# Patient Record
Sex: MALE | Race: WHITE | NOT HISPANIC OR LATINO
[De-identification: names, ages, dates, MRNs, and addresses within clinical notes are randomized per-mention and may not be internally consistent; named-entity substitution may affect disease eponyms.]

---

## 2017-05-12 ENCOUNTER — APPOINTMENT (OUTPATIENT)
Dept: NEPHROLOGY | Facility: CLINIC | Age: 62
End: 2017-05-12

## 2017-05-12 VITALS — HEART RATE: 62 BPM | DIASTOLIC BLOOD PRESSURE: 76 MMHG | SYSTOLIC BLOOD PRESSURE: 160 MMHG

## 2017-05-12 VITALS — WEIGHT: 224 LBS

## 2017-05-15 ENCOUNTER — RX RENEWAL (OUTPATIENT)
Age: 62
End: 2017-05-15

## 2017-10-06 ENCOUNTER — RX RENEWAL (OUTPATIENT)
Age: 62
End: 2017-10-06

## 2017-10-10 ENCOUNTER — RX RENEWAL (OUTPATIENT)
Age: 62
End: 2017-10-10

## 2017-11-09 ENCOUNTER — APPOINTMENT (OUTPATIENT)
Dept: NEPHROLOGY | Facility: CLINIC | Age: 62
End: 2017-11-09
Payer: COMMERCIAL

## 2017-11-09 VITALS — WEIGHT: 233 LBS | HEART RATE: 60 BPM | DIASTOLIC BLOOD PRESSURE: 80 MMHG | SYSTOLIC BLOOD PRESSURE: 140 MMHG

## 2017-11-09 PROCEDURE — 99214 OFFICE O/P EST MOD 30 MIN: CPT

## 2017-11-09 RX ORDER — FINASTERIDE 5 MG/1
5 TABLET, FILM COATED ORAL
Refills: 0 | Status: ACTIVE | COMMUNITY

## 2017-11-14 ENCOUNTER — RX RENEWAL (OUTPATIENT)
Age: 62
End: 2017-11-14

## 2018-02-02 NOTE — H&P ADULT - NSHPPHYSICALEXAM_GEN_ALL_CORE
Normal head, atraumatic. Normal skin, no rashes/lesions/erythema.    MSK: L-Spine: +LROM due to pain.      Rest of PE per medical clearance.

## 2018-02-02 NOTE — ASU PATIENT PROFILE, ADULT - MEDICATION HERBAL REMEDIES, PROFILE
Subjective     History provided by:  Patient and spouse   used: No        Review of Systems    No past medical history on file.    No Known Allergies    No past surgical history on file.    No family history on file.    Social History     Social History   • Marital status: N/A     Spouse name: N/A   • Number of children: N/A   • Years of education: N/A     Social History Main Topics   • Smoking status: Not on file   • Smokeless tobacco: Not on file   • Alcohol use Not on file   • Drug use: Not on file   • Sexual activity: Not on file     Other Topics Concern   • Not on file     Social History Narrative           Objective   Physical Exam    Procedures         ED Course  ED Course                  MDM  Number of Diagnoses or Management Options      Final diagnoses:   None          no

## 2018-02-02 NOTE — H&P ADULT - PROBLEM SELECTOR PLAN 1
Admit to Dr. Sparks's service.  Plan for elective right lumbar laminectomy L5-S1.  Medically stable and cleared for surgery by Dr. Pacheco.

## 2018-02-02 NOTE — H&P ADULT - NSHPLABSRESULTS_GEN_ALL_CORE
Preop CBC, BMP, PT/PTT/INR, UA within normal limits- reviewed by medical clearance.   Preop EKG: Sinus bradycardia, reviewed by medical clearance.   CXR: Within normal limits, per medical clearance.

## 2018-02-05 ENCOUNTER — OUTPATIENT (OUTPATIENT)
Dept: OUTPATIENT SERVICES | Facility: HOSPITAL | Age: 63
LOS: 1 days | Discharge: HOME | End: 2018-02-05
Payer: COMMERCIAL

## 2018-02-05 ENCOUNTER — RESULT REVIEW (OUTPATIENT)
Age: 63
End: 2018-02-05

## 2018-02-05 VITALS
WEIGHT: 225.09 LBS | TEMPERATURE: 99 F | HEART RATE: 58 BPM | OXYGEN SATURATION: 98 % | HEIGHT: 72 IN | SYSTOLIC BLOOD PRESSURE: 170 MMHG | DIASTOLIC BLOOD PRESSURE: 87 MMHG

## 2018-02-05 VITALS
RESPIRATION RATE: 21 BRPM | HEART RATE: 66 BPM | SYSTOLIC BLOOD PRESSURE: 140 MMHG | OXYGEN SATURATION: 95 % | DIASTOLIC BLOOD PRESSURE: 78 MMHG

## 2018-02-05 DIAGNOSIS — M51.9 UNSPECIFIED THORACIC, THORACOLUMBAR AND LUMBOSACRAL INTERVERTEBRAL DISC DISORDER: ICD-10-CM

## 2018-02-05 DIAGNOSIS — Z98.890 OTHER SPECIFIED POSTPROCEDURAL STATES: Chronic | ICD-10-CM

## 2018-02-05 PROCEDURE — 86850 RBC ANTIBODY SCREEN: CPT

## 2018-02-05 PROCEDURE — 86900 BLOOD TYPING SEROLOGIC ABO: CPT

## 2018-02-05 PROCEDURE — 95940 IONM IN OPERATNG ROOM 15 MIN: CPT

## 2018-02-05 PROCEDURE — 97161 PT EVAL LOW COMPLEX 20 MIN: CPT

## 2018-02-05 PROCEDURE — 86901 BLOOD TYPING SEROLOGIC RH(D): CPT

## 2018-02-05 PROCEDURE — C1889: CPT

## 2018-02-05 PROCEDURE — 63030 LAMOT DCMPRN NRV RT 1 LMBR: CPT | Mod: RT

## 2018-02-05 PROCEDURE — 76000 FLUOROSCOPY <1 HR PHYS/QHP: CPT

## 2018-02-05 PROCEDURE — 88304 TISSUE EXAM BY PATHOLOGIST: CPT

## 2018-02-05 RX ORDER — SODIUM CHLORIDE 9 MG/ML
1000 INJECTION, SOLUTION INTRAVENOUS
Qty: 0 | Refills: 0 | Status: DISCONTINUED | OUTPATIENT
Start: 2018-02-05 | End: 2018-02-05

## 2018-02-05 RX ORDER — TRAMADOL HYDROCHLORIDE 50 MG/1
1 TABLET ORAL
Qty: 15 | Refills: 0 | OUTPATIENT
Start: 2018-02-05

## 2018-02-05 RX ORDER — OXYCODONE AND ACETAMINOPHEN 5; 325 MG/1; MG/1
1 TABLET ORAL
Qty: 0 | Refills: 0 | Status: DISCONTINUED | OUTPATIENT
Start: 2018-02-05 | End: 2018-02-05

## 2018-02-05 RX ORDER — OXYCODONE AND ACETAMINOPHEN 5; 325 MG/1; MG/1
2 TABLET ORAL EVERY 4 HOURS
Qty: 0 | Refills: 0 | Status: DISCONTINUED | OUTPATIENT
Start: 2018-02-05 | End: 2018-02-05

## 2018-02-05 NOTE — PROGRESS NOTE ADULT - SUBJECTIVE AND OBJECTIVE BOX
Patient seen and examined in preoperative area with wife at chairside.  Exam notable for persistent and unchanged right       lower extremity weakness with trace EHL, 2-3 Dorsiflexion, knee flexion 4/5.  The condition and treatment options were discussed with the patient.  The risks, benefits and alternatives to surgery were discussed.   The primary goal of surgery is to relieve the residual  radicular pain but also maximize recovery of motor function for the drop foot.  No guarantee can be made for any, let alone, full motor recovery. The complications of surgery were discussed and include, but are not limited to, wound problems, infection, bleeding, vascular injury, dural defect/cerebrospinal fluid leak, instability, need for further surgery including fusion/instrumention, recurrent disc herniation/radicular symptoms, persistent symptoms, deep vein thrombosis, pulmonary embolus, myocardial infarction, stroke and death.  All questions were answered and informed consent was obtained.  Patient was also advised about slightly elevated liver enzymes and he reported knowing that he had (and was treated for) hepatitis C in the 1980's with  what he recalls was interferon. He thought he was "cured." Based on the preliminary lab results as reported by Dr. Pacheco, he has hepatitis C at this time.  Patient was informed of this new finding and the need further workup and evaluation following his surgical recovery.  Notes and/or documentation on this chart is/are signed but not reviewed for accuracy.

## 2018-02-05 NOTE — PROGRESS NOTE ADULT - ASSESSMENT
Spoke with patient and family (wife) preoperatively and they aware I will be assisting during today's surgery and disclosure performed.

## 2018-02-05 NOTE — PACU DISCHARGE NOTE - COMMENTS
pt met pacu criteria to be d/c'd home. Vitals stable. pain controlled. No bleeding noted. OOB ambulating w/walker w/PT. Copy of provider post op instructions/medication reconcilliation instructions given. Pt verbalized understanding. IV d/c'd. Pt home w/walker. Pt escorted to lobby w/wife and nursing attendant via wheelchair.

## 2018-02-14 LAB — SURGICAL PATHOLOGY STUDY: SIGNIFICANT CHANGE UP

## 2018-04-09 ENCOUNTER — APPOINTMENT (OUTPATIENT)
Dept: NEPHROLOGY | Facility: CLINIC | Age: 63
End: 2018-04-09
Payer: COMMERCIAL

## 2018-04-09 VITALS — HEART RATE: 60 BPM | DIASTOLIC BLOOD PRESSURE: 88 MMHG | SYSTOLIC BLOOD PRESSURE: 158 MMHG

## 2018-04-09 VITALS — DIASTOLIC BLOOD PRESSURE: 84 MMHG | SYSTOLIC BLOOD PRESSURE: 144 MMHG

## 2018-04-09 PROBLEM — M10.9 GOUT, UNSPECIFIED: Chronic | Status: ACTIVE | Noted: 2018-02-02

## 2018-04-09 PROBLEM — I10 ESSENTIAL (PRIMARY) HYPERTENSION: Chronic | Status: ACTIVE | Noted: 2018-02-02

## 2018-04-09 PROBLEM — N40.0 BENIGN PROSTATIC HYPERPLASIA WITHOUT LOWER URINARY TRACT SYMPTOMS: Chronic | Status: ACTIVE | Noted: 2018-02-02

## 2018-04-09 PROCEDURE — 99214 OFFICE O/P EST MOD 30 MIN: CPT

## 2018-05-23 ENCOUNTER — RX RENEWAL (OUTPATIENT)
Age: 63
End: 2018-05-23

## 2018-06-25 ENCOUNTER — APPOINTMENT (OUTPATIENT)
Dept: NEPHROLOGY | Facility: CLINIC | Age: 63
End: 2018-06-25
Payer: COMMERCIAL

## 2018-06-25 VITALS — HEART RATE: 62 BPM | DIASTOLIC BLOOD PRESSURE: 88 MMHG | SYSTOLIC BLOOD PRESSURE: 144 MMHG

## 2018-06-25 PROCEDURE — 99213 OFFICE O/P EST LOW 20 MIN: CPT

## 2018-10-08 ENCOUNTER — APPOINTMENT (OUTPATIENT)
Dept: NEPHROLOGY | Facility: CLINIC | Age: 63
End: 2018-10-08

## 2018-10-26 ENCOUNTER — RX RENEWAL (OUTPATIENT)
Age: 63
End: 2018-10-26

## 2018-11-15 ENCOUNTER — APPOINTMENT (OUTPATIENT)
Dept: NEPHROLOGY | Facility: CLINIC | Age: 63
End: 2018-11-15
Payer: COMMERCIAL

## 2018-11-15 VITALS — HEART RATE: 66 BPM | SYSTOLIC BLOOD PRESSURE: 136 MMHG | WEIGHT: 233 LBS | DIASTOLIC BLOOD PRESSURE: 80 MMHG

## 2018-11-15 PROCEDURE — 99214 OFFICE O/P EST MOD 30 MIN: CPT

## 2018-12-26 ENCOUNTER — RX RENEWAL (OUTPATIENT)
Age: 63
End: 2018-12-26

## 2019-02-01 ENCOUNTER — RX RENEWAL (OUTPATIENT)
Age: 64
End: 2019-02-01

## 2019-05-16 ENCOUNTER — APPOINTMENT (OUTPATIENT)
Dept: NEPHROLOGY | Facility: CLINIC | Age: 64
End: 2019-05-16

## 2019-05-29 ENCOUNTER — RX RENEWAL (OUTPATIENT)
Age: 64
End: 2019-05-29

## 2019-09-01 NOTE — PATIENT PROFILE ADULT. - VISION (WITH CORRECTIVE LENSES IF THE PATIENT USUALLY WEARS THEM):
Partially impaired: cannot see medication labels or newsprint, but can see obstacles in path, and the surrounding layout; can count fingers at arm's length/reading glasses Offered and patient declined

## 2019-10-21 ENCOUNTER — RX RENEWAL (OUTPATIENT)
Age: 64
End: 2019-10-21

## 2019-10-30 ENCOUNTER — RX RENEWAL (OUTPATIENT)
Age: 64
End: 2019-10-30

## 2019-12-12 ENCOUNTER — RX RENEWAL (OUTPATIENT)
Age: 64
End: 2019-12-12

## 2020-01-29 ENCOUNTER — APPOINTMENT (OUTPATIENT)
Dept: NEPHROLOGY | Facility: CLINIC | Age: 65
End: 2020-01-29
Payer: COMMERCIAL

## 2020-01-29 VITALS
WEIGHT: 236 LBS | SYSTOLIC BLOOD PRESSURE: 132 MMHG | HEART RATE: 52 BPM | BODY MASS INDEX: 33.04 KG/M2 | HEIGHT: 71 IN | DIASTOLIC BLOOD PRESSURE: 70 MMHG

## 2020-01-29 PROCEDURE — 99215 OFFICE O/P EST HI 40 MIN: CPT

## 2020-01-29 NOTE — CONSULT LETTER
[Dear  ___] : Dear  [unfilled], [Courtesy Letter:] : I had the pleasure of seeing your patient, [unfilled], in my office today. [Please see my note below.] : Please see my note below. [Consult Closing:] : Thank you very much for allowing me to participate in the care of this patient.  If you have any questions, please do not hesitate to contact me. [Sincerely,] : Sincerely, [FreeTextEntry3] : Sincerely, \par \par Luis Uribe MD, FACP  [DrLuis  ___] : Dr. FRENCH [DrLuis ___] : Dr. FRENCH

## 2020-01-29 NOTE — REVIEW OF SYSTEMS
[Recent Weight Loss (___ Lbs)] : recent [unfilled] ~Ulb weight loss [Nocturia] : nocturia [Hesitancy] : urinary hesitancy [Negative] : Heme/Lymph [FreeTextEntry2] : 10 lbs last 3 wks [FreeTextEntry8] : hx BPH

## 2020-01-29 NOTE — ASSESSMENT
[FreeTextEntry1] : 64 yo man with hx HBP, gout,MANJIT, obesiy, hyperlipidemia/metab synd?, hx adrenal "cyst" - no hypokalemia, no sx's to suggest pheo. Doing remarkably well 9 days post TKR. He will see Dr Mendoza at [facility] today, and Dr Robert Mondragon there on Feb 6 re: adrenal. I have not seen him before, and welcome both their thoughts. My concerns expressed to [place] today: 1.consider tapering/stopping b-blocker (bradycardia, wt gain, fatigue, lack of efficacy at age 63 or low PRA)  ,   2.NSAID exposure - may raise BP and explain high home readings. Would like to reduce dose and stop as soon as possible.   3. If TG still 280 & HDL 30, would consider adding Vascepa both to improve those, and provide CV protection based on recent large outcomes trial.  4. Need to continue wt loss, which will benefit OA, MANJIT,lipids, CV risk etc.

## 2020-01-29 NOTE — PHYSICAL EXAM
[General Appearance - Alert] : alert [General Appearance - In No Acute Distress] : in no acute distress [Sclera] : the sclera and conjunctiva were normal [PERRL With Normal Accommodation] : pupils were equal in size, round, and reactive to light [Extraocular Movements] : extraocular movements were intact [Outer Ear] : the ears and nose were normal in appearance [Oropharynx] : the oropharynx was normal [Neck Appearance] : the appearance of the neck was normal [Neck Cervical Mass (___cm)] : no neck mass was observed [Jugular Venous Distention Increased] : there was no jugular-venous distention [Thyroid Diffuse Enlargement] : the thyroid was not enlarged [Thyroid Nodule] : there were no palpable thyroid nodules [Auscultation Breath Sounds / Voice Sounds] : lungs were clear to auscultation bilaterally [Heart Rate And Rhythm] : heart rate was normal and rhythm regular [Heart Sounds] : normal S1 and S2 [Heart Sounds Gallop] : no gallops [Murmurs] : no murmurs [Heart Sounds Pericardial Friction Rub] : no pericardial rub [Full Pulse] : the pedal pulses are present [Edema] : there was no peripheral edema [Bowel Sounds] : normal bowel sounds [Abdomen Soft] : soft [Abdomen Tenderness] : non-tender [Abdomen Mass (___ Cm)] : no abdominal mass palpated [Cervical Lymph Nodes Enlarged Posterior Bilaterally] : posterior cervical [Cervical Lymph Nodes Enlarged Anterior Bilaterally] : anterior cervical [Supraclavicular Lymph Nodes Enlarged Bilaterally] : supraclavicular [Axillary Lymph Nodes Enlarged Bilaterally] : axillary [Femoral Lymph Nodes Enlarged Bilaterally] : femoral [Inguinal Lymph Nodes Enlarged Bilaterally] : inguinal [No CVA Tenderness] : no ~M costovertebral angle tenderness [No Spinal Tenderness] : no spinal tenderness [Nail Clubbing] : no clubbing  or cyanosis of the fingernails [Musculoskeletal - Swelling] : no joint swelling seen [Motor Tone] : muscle strength and tone were normal [FreeTextEntry1] : ambulating remarkably postop, but limping [Skin Color & Pigmentation] : normal skin color and pigmentation [Skin Turgor] : normal skin turgor [] : no rash [Deep Tendon Reflexes (DTR)] : deep tendon reflexes were 2+ and symmetric [Sensation] : the sensory exam was normal to light touch and pinprick [No Focal Deficits] : no focal deficits [Oriented To Time, Place, And Person] : oriented to person, place, and time [Impaired Insight] : insight and judgment were intact [Affect] : the affect was normal

## 2020-01-29 NOTE — HISTORY OF PRESENT ILLNESS
[FreeTextEntry1] : 64 yo man with hx of HBP, gout, hyperlipidemia (probable metabolic synd -high TG, low HDL), hep C MANJIT- on CPAP, BPH, OA knees. Now 9 day post-op rt knee replacement at [facility] by Dr Young, and doing great!   Was given meloxicam 15 mg qd.  BP at home running 140's-150's, with HR in low 50's (was 40's during sleep postop). Gout quiescent x 3+ yrs. BP was 133/66 at d/c. BP meds have been : hctz 25 qd, metoprolol 100 qd, ramipril 10 bid.

## 2020-02-03 ENCOUNTER — RX RENEWAL (OUTPATIENT)
Age: 65
End: 2020-02-03

## 2020-05-11 ENCOUNTER — RX RENEWAL (OUTPATIENT)
Age: 65
End: 2020-05-11

## 2020-06-08 ENCOUNTER — RX RENEWAL (OUTPATIENT)
Age: 65
End: 2020-06-08

## 2020-07-06 ENCOUNTER — RX RENEWAL (OUTPATIENT)
Age: 65
End: 2020-07-06

## 2020-07-07 ENCOUNTER — RX RENEWAL (OUTPATIENT)
Age: 65
End: 2020-07-07

## 2020-08-18 ENCOUNTER — APPOINTMENT (OUTPATIENT)
Dept: NEPHROLOGY | Facility: CLINIC | Age: 65
End: 2020-08-18
Payer: COMMERCIAL

## 2020-08-18 VITALS
HEART RATE: 64 BPM | BODY MASS INDEX: 32.9 KG/M2 | WEIGHT: 235 LBS | HEIGHT: 71 IN | SYSTOLIC BLOOD PRESSURE: 146 MMHG | DIASTOLIC BLOOD PRESSURE: 76 MMHG

## 2020-08-18 PROCEDURE — 99215 OFFICE O/P EST HI 40 MIN: CPT

## 2020-08-18 NOTE — REVIEW OF SYSTEMS
[Hesitancy] : urinary hesitancy [Nocturia] : nocturia [Joint Pain] : joint pain [Arthralgias] : arthralgias [Negative] : Endocrine

## 2020-08-18 NOTE — ASSESSMENT
[FreeTextEntry1] : 65-year-old man with resistant hypertension despite a good 4 drug regimen -his systolic BP is averaging 145 today, which is certainly suboptimal.  While he does not appear to have evidence of primary aldosteronism for many data I have, the fact that he has been worked up for primary aldosteronism and has resistant hypertension, suggest that Spironolactone 25 mg daily would be reasonable add-on choice  -with substantial evidence from a number of studies including [facility], 2015.  I have asked him to check his BP at home and return in 5 weeks to assess possible improvement in BP control.  I have also put in a requisition for labs to be drawn which will be available to Dr. Gale Muniz, who recently started him on a statin.  He would also be a good candidate probably for Vascepa, not only to control hypertriglyceridemia but also reduce cardiovascular risk.

## 2020-08-18 NOTE — HISTORY OF PRESENT ILLNESS
[FreeTextEntry1] : 65-year-old man with a history of resistant hypertension, hyperlipidemia, hyperuricemia/gout, metabolic syndrome, MANJIT/CPAP, hepatitis C, BPH -he underwent total knee replacement in January and took meloxicam 15 mg for probably 2 to 3 weeks.  His current antihypertensive regimen consists of ramipril 10 mg twice daily, metoprolol 100 mg daily, amlodipine 10 mg daily, and torsemide 7.5 mg daily.  However, his BP is typically 1 40-1 60 systolic -he has not checked it at home recently.  His potassium has typically been in the range of 3.6-4.1.  He is known to have an adrenal gland cyst which was thoroughly evaluated at [facility] by argenis Mondragon & Kelly.  He apparently had an adrenal vein catheterization/sampling, and presumably it did not lateralize to the side of the cyst.  His renal function has been normal with a creatinine typically in the 0.8 range.

## 2020-08-18 NOTE — CONSULT LETTER
[Dear  ___] : Dear  [unfilled], [Consult Closing:] : Thank you very much for allowing me to participate in the care of this patient.  If you have any questions, please do not hesitate to contact me. [Consult Letter:] : I had the pleasure of evaluating your patient, [unfilled]. [Please see my note below.] : Please see my note below. [Sincerely,] : Sincerely, [FreeTextEntry2] : Dr Lita Licona [DrLuis  ___] : Dr. FRENCH [DrLuis ___] : Dr. FRENCH [FreeTextEntry3] : Sincerely, \par \par Luis Uribe MD, FACP

## 2020-08-18 NOTE — PHYSICAL EXAM
[General Appearance - Alert] : alert [General Appearance - In No Acute Distress] : in no acute distress [Sclera] : the sclera and conjunctiva were normal [PERRL With Normal Accommodation] : pupils were equal in size, round, and reactive to light [Extraocular Movements] : extraocular movements were intact [Outer Ear] : the ears and nose were normal in appearance [Oropharynx] : the oropharynx was normal [Neck Appearance] : the appearance of the neck was normal [Jugular Venous Distention Increased] : there was no jugular-venous distention [Neck Cervical Mass (___cm)] : no neck mass was observed [Thyroid Nodule] : there were no palpable thyroid nodules [Thyroid Diffuse Enlargement] : the thyroid was not enlarged [Auscultation Breath Sounds / Voice Sounds] : lungs were clear to auscultation bilaterally [Heart Rate And Rhythm] : heart rate was normal and rhythm regular [Heart Sounds] : normal S1 and S2 [Heart Sounds Gallop] : no gallops [Murmurs] : no murmurs [Heart Sounds Pericardial Friction Rub] : no pericardial rub [Full Pulse] : the pedal pulses are present [Edema] : there was no peripheral edema [Bowel Sounds] : normal bowel sounds [Abdomen Soft] : soft [Abdomen Tenderness] : non-tender [Abdomen Mass (___ Cm)] : no abdominal mass palpated [Cervical Lymph Nodes Enlarged Anterior Bilaterally] : anterior cervical [Cervical Lymph Nodes Enlarged Posterior Bilaterally] : posterior cervical [Femoral Lymph Nodes Enlarged Bilaterally] : femoral [Axillary Lymph Nodes Enlarged Bilaterally] : axillary [Supraclavicular Lymph Nodes Enlarged Bilaterally] : supraclavicular [No CVA Tenderness] : no ~M costovertebral angle tenderness [Inguinal Lymph Nodes Enlarged Bilaterally] : inguinal [Nail Clubbing] : no clubbing  or cyanosis of the fingernails [No Spinal Tenderness] : no spinal tenderness [Musculoskeletal - Swelling] : no joint swelling seen [Motor Tone] : muscle strength and tone were normal [FreeTextEntry1] : ambulating remarkably postop, but limping [Skin Color & Pigmentation] : normal skin color and pigmentation [] : no rash [Skin Turgor] : normal skin turgor [Deep Tendon Reflexes (DTR)] : deep tendon reflexes were 2+ and symmetric [No Focal Deficits] : no focal deficits [Sensation] : the sensory exam was normal to light touch and pinprick [Impaired Insight] : insight and judgment were intact [Oriented To Time, Place, And Person] : oriented to person, place, and time [Affect] : the affect was normal

## 2020-09-21 ENCOUNTER — APPOINTMENT (OUTPATIENT)
Dept: NEPHROLOGY | Facility: CLINIC | Age: 65
End: 2020-09-21
Payer: COMMERCIAL

## 2020-09-21 VITALS
SYSTOLIC BLOOD PRESSURE: 152 MMHG | WEIGHT: 235 LBS | DIASTOLIC BLOOD PRESSURE: 78 MMHG | HEIGHT: 71 IN | HEART RATE: 68 BPM | BODY MASS INDEX: 32.9 KG/M2

## 2020-09-21 VITALS — DIASTOLIC BLOOD PRESSURE: 74 MMHG | SYSTOLIC BLOOD PRESSURE: 142 MMHG

## 2020-09-21 DIAGNOSIS — M19.90 UNSPECIFIED OSTEOARTHRITIS, UNSPECIFIED SITE: ICD-10-CM

## 2020-09-21 PROCEDURE — 99215 OFFICE O/P EST HI 40 MIN: CPT

## 2020-09-21 NOTE — ASSESSMENT
[FreeTextEntry1] : 65-year-old man with resistant/refractory hypertension, who is BP is not yet optimal despite 5 medications.  He never did the labs that I ordered, but has the requisition -he promises to go for that later today.  I have asked him to start Edarbi chlor 40/12.5 mg daily (free samples) in place of ramipril and torsemide.  If this works better, it will also reduce his pill burden substantially by replacing 3-1/2 pills/day with 1.  We will schedule a telehealth visit in 2 to 3 weeks to assess his response to this altered regimen.  It is not clear to me whether Spironolactone at 25 mg daily helped at all but I will not stop at this time, particularly since we do not know his current potassium yet.

## 2020-09-21 NOTE — PHYSICAL EXAM
[General Appearance - Alert] : alert [General Appearance - In No Acute Distress] : in no acute distress [Sclera] : the sclera and conjunctiva were normal [PERRL With Normal Accommodation] : pupils were equal in size, round, and reactive to light [Extraocular Movements] : extraocular movements were intact [Outer Ear] : the ears and nose were normal in appearance [Oropharynx] : the oropharynx was normal [Neck Appearance] : the appearance of the neck was normal [Neck Cervical Mass (___cm)] : no neck mass was observed [Jugular Venous Distention Increased] : there was no jugular-venous distention [Thyroid Diffuse Enlargement] : the thyroid was not enlarged [Thyroid Nodule] : there were no palpable thyroid nodules [Auscultation Breath Sounds / Voice Sounds] : lungs were clear to auscultation bilaterally [Heart Rate And Rhythm] : heart rate was normal and rhythm regular [Heart Sounds] : normal S1 and S2 [Heart Sounds Gallop] : no gallops [Murmurs] : no murmurs [Heart Sounds Pericardial Friction Rub] : no pericardial rub [Full Pulse] : the pedal pulses are present [Edema] : there was no peripheral edema [Bowel Sounds] : normal bowel sounds [Abdomen Soft] : soft [Abdomen Tenderness] : non-tender [Abdomen Mass (___ Cm)] : no abdominal mass palpated [Cervical Lymph Nodes Enlarged Posterior Bilaterally] : posterior cervical [Cervical Lymph Nodes Enlarged Anterior Bilaterally] : anterior cervical [Supraclavicular Lymph Nodes Enlarged Bilaterally] : supraclavicular [Axillary Lymph Nodes Enlarged Bilaterally] : axillary [Femoral Lymph Nodes Enlarged Bilaterally] : femoral [Inguinal Lymph Nodes Enlarged Bilaterally] : inguinal [No CVA Tenderness] : no ~M costovertebral angle tenderness [No Spinal Tenderness] : no spinal tenderness [Nail Clubbing] : no clubbing  or cyanosis of the fingernails [Musculoskeletal - Swelling] : no joint swelling seen [Motor Tone] : muscle strength and tone were normal [Skin Color & Pigmentation] : normal skin color and pigmentation [Skin Turgor] : normal skin turgor [] : no rash [Deep Tendon Reflexes (DTR)] : deep tendon reflexes were 2+ and symmetric [Sensation] : the sensory exam was normal to light touch and pinprick [No Focal Deficits] : no focal deficits [Oriented To Time, Place, And Person] : oriented to person, place, and time [Impaired Insight] : insight and judgment were intact [Affect] : the affect was normal [FreeTextEntry1] : ambulating remarkably postop, but limping

## 2020-09-21 NOTE — HISTORY OF PRESENT ILLNESS
[FreeTextEntry1] : 65-year-old man with a history of resistant hypertension, hyperlipidemia, hyper uricemia/gout, metabolic syndrome, MANJIT/CPAP, hepatitis C, BPH, and osteoarthritis status post right total knee replacement 8 months ago.  He did take meloxicam for several weeks after that but not since then.  His current antihypertensive regimen consists of ramipril 10 mg twice daily, metoprolol 100 mg daily, amlodipine 10 mg daily,  torsemide 7.5 mg daily.,  And Spironolactone 25 mg daily.  However his BP was 144/76 on his last visit, and is averaging about 145/75 at home.  He has not had any recent gout attacks.  He tolerates medications quite well.

## 2020-10-11 ENCOUNTER — RX RENEWAL (OUTPATIENT)
Age: 65
End: 2020-10-11

## 2020-10-12 ENCOUNTER — APPOINTMENT (OUTPATIENT)
Dept: NEPHROLOGY | Facility: CLINIC | Age: 65
End: 2020-10-12
Payer: COMMERCIAL

## 2020-10-12 VITALS
SYSTOLIC BLOOD PRESSURE: 130 MMHG | DIASTOLIC BLOOD PRESSURE: 74 MMHG | HEART RATE: 72 BPM | BODY MASS INDEX: 32.9 KG/M2 | WEIGHT: 235 LBS | HEIGHT: 71 IN

## 2020-10-12 DIAGNOSIS — G47.33 OBSTRUCTIVE SLEEP APNEA (ADULT) (PEDIATRIC): ICD-10-CM

## 2020-10-12 DIAGNOSIS — I10 ESSENTIAL (PRIMARY) HYPERTENSION: ICD-10-CM

## 2020-10-12 DIAGNOSIS — Z80.1 FAMILY HISTORY OF MALIGNANT NEOPLASM OF TRACHEA, BRONCHUS AND LUNG: ICD-10-CM

## 2020-10-12 DIAGNOSIS — Z82.49 FAMILY HISTORY OF ISCHEMIC HEART DISEASE AND OTHER DISEASES OF THE CIRCULATORY SYSTEM: ICD-10-CM

## 2020-10-12 DIAGNOSIS — E27.8 OTHER SPECIFIED DISORDERS OF ADRENAL GLAND: ICD-10-CM

## 2020-10-12 DIAGNOSIS — M54.5 LOW BACK PAIN: ICD-10-CM

## 2020-10-12 DIAGNOSIS — B19.20 UNSPECIFIED VIRAL HEPATITIS C W/OUT HEPATIC COMA: ICD-10-CM

## 2020-10-12 DIAGNOSIS — Z99.89 OBSTRUCTIVE SLEEP APNEA (ADULT) (PEDIATRIC): ICD-10-CM

## 2020-10-12 PROCEDURE — 99214 OFFICE O/P EST MOD 30 MIN: CPT | Mod: 95

## 2020-10-12 NOTE — ASSESSMENT
[FreeTextEntry1] : 65-year-old man with resistant hypertension, who is BP seems to have improved somewhat since starting Edarbi chlor 40/12.5 mg daily -we were able to reduce his pill burden from 3-1/2 pills to 1 by making that change.  He would like to continue to stay on it and we will have him  more samples, a co-pay benefit card -and if that does not work out price wise, we will send a prescription to [facility]

## 2020-10-12 NOTE — HISTORY OF PRESENT ILLNESS
[Home] : at home, [unfilled] , at the time of the visit. [Medical Office: ([facility])___] : at the medical office located in  [Verbal consent obtained from patient] : the patient, [unfilled] [FreeTextEntry1] : Discussed with patient : You have chosen to receive care through the use of tele-media.  It enables healthcare providers at different locations to provide safe, effective, and convenient care through the use of technology.  Please note this is a billable encounter.  As with any healthcare service, there are risks associated with the use of tele-media, including  issues.  You understand that I cannot physically examine you and that you may need to come to the office to complete the assessment.   Patient agreed verbally and understands the risks and benefits of tele-media as explained.  All questions regarding tele-media encounters were answered.\par         65-year-old man with a history of resistant hypertension, hyperlipidemia, hyperuricemia/gout, MANJIT/CPAP, hep C, BPH, osteoarthritis status post right total knee replacement 9 months ago.  He has been on ramipril 10 mg twice daily, metoprolol 100 mg daily amlodipine 10 mg daily, Spironolactone 25 mg daily, and I added Edarbi chlor 40/12.5 mg 3 weeks ago (replacing ramipril 10 twice daily and torsemide 7.5 mg daily).  His BP has improved somewhat with readings like 126/70, 120/64, 130/74, 136/69, but still interspersed occasionally with readings in the mid 140s over 80s.  It appears to both of us that his BP has improved if not yet optimal.  He has no side effects.  He runs out of samples today.

## 2020-10-12 NOTE — PHYSICAL EXAM
[General Appearance - Alert] : alert [General Appearance - In No Acute Distress] : in no acute distress [Sclera] : the sclera and conjunctiva were normal [PERRL With Normal Accommodation] : pupils were equal in size, round, and reactive to light [Outer Ear] : the ears and nose were normal in appearance [Neck Appearance] : the appearance of the neck was normal [Neck Cervical Mass (___cm)] : no neck mass was observed [Jugular Venous Distention Increased] : there was no jugular-venous distention [Abnormal Walk] : normal gait [Nail Clubbing] : no clubbing  or cyanosis of the fingernails [Musculoskeletal - Swelling] : no joint swelling seen [Skin Color & Pigmentation] : normal skin color and pigmentation [Skin Turgor] : normal skin turgor [] : no rash [Deep Tendon Reflexes (DTR)] : deep tendon reflexes were 2+ and symmetric [No Focal Deficits] : no focal deficits [Oriented To Time, Place, And Person] : oriented to person, place, and time [Impaired Insight] : insight and judgment were intact [Affect] : the affect was normal

## 2020-10-20 ENCOUNTER — RX RENEWAL (OUTPATIENT)
Age: 65
End: 2020-10-20

## 2021-02-08 ENCOUNTER — EMERGENCY (EMERGENCY)
Facility: HOSPITAL | Age: 66
LOS: 1 days | Discharge: HOME | End: 2021-02-08
Admitting: EMERGENCY MEDICINE
Payer: COMMERCIAL

## 2021-02-08 VITALS
RESPIRATION RATE: 18 BRPM | TEMPERATURE: 98 F | SYSTOLIC BLOOD PRESSURE: 147 MMHG | HEIGHT: 72 IN | HEART RATE: 62 BPM | DIASTOLIC BLOOD PRESSURE: 74 MMHG | OXYGEN SATURATION: 100 %

## 2021-02-08 DIAGNOSIS — Z98.890 OTHER SPECIFIED POSTPROCEDURAL STATES: Chronic | ICD-10-CM

## 2021-02-08 DIAGNOSIS — Z20.822 CONTACT WITH AND (SUSPECTED) EXPOSURE TO COVID-19: ICD-10-CM

## 2021-02-08 DIAGNOSIS — Z79.1 LONG TERM (CURRENT) USE OF NON-STEROIDAL ANTI-INFLAMMATORIES (NSAID): ICD-10-CM

## 2021-02-08 LAB — SARS-COV-2 RNA SPEC QL NAA+PROBE: SIGNIFICANT CHANGE UP

## 2021-02-08 PROCEDURE — 99283 EMERGENCY DEPT VISIT LOW MDM: CPT

## 2021-02-08 PROCEDURE — 99282 EMERGENCY DEPT VISIT SF MDM: CPT

## 2021-02-08 PROCEDURE — U0005: CPT

## 2021-02-08 PROCEDURE — U0003: CPT

## 2021-02-08 NOTE — ED PROVIDER NOTE - PATIENT PORTAL LINK FT
You can access the FollowMyHealth Patient Portal offered by [facility] by registering at the following website: [email or web address]. By joining [facility]’s FollowMyHealth portal, you will also be able to view your health information using other applications (apps) compatible with our system.

## 2021-02-08 NOTE — ED PROVIDER NOTE - HIV OFFER
"Pharmacy Chemotherapy Verification    Treatment deferred due to illness    Gaye Barbazachary Antoine  Dx: RA        Protocol: Rituximab     Rituximab 1000 mg IV Days 1 and 15  [id number]  Jag HITCHCOCK, et al. N Engl J Med 2004; [id number]. Efficacy of B-Cell-Targeted Therapy with Rituximab in Patients with Rheumatoid Arthritis       Allergies: Sulfa drugs      /91   Pulse 87   Temp 36.4 °C (97.6 °F)   Resp 18   Ht 1.651 m (5' 5\")   Wt 80.6 kg (177 lb 11.1 oz)   LMP 03/01/2005   SpO2 90%   BMI 29.57 kg/m²     Body surface area is 1.92 meters squared.    LABS 11/28/2017  ANC~ 1240 Plt = 261k   Hgb/Hct = 16.3/50.0   SCr = 0.72 mg/dL CrCl ~ 83 mL/min (min Scr 0.7 used) LFT's = 26/37/92 TBili = 0.5       9/28/16 Quantiferon Gold: negative  12/2/16 Hep B: negative     Drug Order   (Drug name, dose, route, IV Fluid & volume, frequency, number of doses)                  Fixed dose. No calulation required.  OK to treat with final dose.       By my signature below, I confirm this process was performed independently with the BSA and all final chemotherapy dosing calculations congruent. I have reviewed the above chemotherapy order and that my calculation of the final dose and BSA (when applicable) corroborate those calculations of the  pharmacist. Discrepancies of 5% or greater in the written dose have been addressed and documented within the [facility] Progress notes.    BROOKS Fernandez, PharmD            " Opt out

## 2021-02-08 NOTE — ED PROVIDER NOTE - CONDITION AT DISCHARGE:
I have personally seen and examined this patient.  I have fully participated in the care of this patient. I have reviewed all pertinent clinical information, including history, physical exam, plan and the Resident’s note and agree except as noted.
Satisfactory

## 2021-02-17 ENCOUNTER — RX RENEWAL (OUTPATIENT)
Age: 66
End: 2021-02-17

## 2021-03-12 ENCOUNTER — RX RENEWAL (OUTPATIENT)
Age: 66
End: 2021-03-12

## 2021-03-14 ENCOUNTER — RX RENEWAL (OUTPATIENT)
Age: 66
End: 2021-03-14

## 2021-04-05 ENCOUNTER — RX RENEWAL (OUTPATIENT)
Age: 66
End: 2021-04-05

## 2021-06-01 ENCOUNTER — RX RENEWAL (OUTPATIENT)
Age: 66
End: 2021-06-01

## 2021-06-07 ENCOUNTER — RX RENEWAL (OUTPATIENT)
Age: 66
End: 2021-06-07

## 2021-06-16 ENCOUNTER — RX RENEWAL (OUTPATIENT)
Age: 66
End: 2021-06-16

## 2021-07-07 ENCOUNTER — RX RENEWAL (OUTPATIENT)
Age: 66
End: 2021-07-07

## 2021-08-09 ENCOUNTER — RX RENEWAL (OUTPATIENT)
Age: 66
End: 2021-08-09

## 2021-09-13 ENCOUNTER — RX RENEWAL (OUTPATIENT)
Age: 66
End: 2021-09-13

## 2021-09-22 ENCOUNTER — APPOINTMENT (OUTPATIENT)
Dept: NEPHROLOGY | Facility: CLINIC | Age: 66
End: 2021-09-22
Payer: COMMERCIAL

## 2021-09-22 ENCOUNTER — RX RENEWAL (OUTPATIENT)
Age: 66
End: 2021-09-22

## 2021-09-22 VITALS — HEART RATE: 56 BPM | OXYGEN SATURATION: 98 % | DIASTOLIC BLOOD PRESSURE: 65 MMHG | SYSTOLIC BLOOD PRESSURE: 107 MMHG

## 2021-09-22 PROCEDURE — 99214 OFFICE O/P EST MOD 30 MIN: CPT

## 2021-09-22 RX ORDER — RAMIPRIL 10 MG/1
10 CAPSULE ORAL
Qty: 180 | Refills: 3 | Status: DISCONTINUED | COMMUNITY
Start: 2017-11-14 | End: 2021-09-22

## 2021-09-26 NOTE — ASSESSMENT
[FreeTextEntry1] : all lab data was reviewed with patient in detail from 9/14/2021\par 65 yo man with HTN, CKD 3, hyperkalemia, hyperlipidemia, hyperuricemia/gout, MANJIT/CPAP, hep C, BPH, osteoarthritis\par -HTN- BP on low side today.\par with increases in creat and K- \par instructed to dc spironolactone\par reviewed home BP monitoring technique: seated position, arm supported on table- 3 measurements 2-5 minutes apart- record lowest BP \par reminded to forward monthly BP readings to office.\par will need repeat chem 1-2 weeks\par \par f/u OV 2-3 months

## 2021-09-26 NOTE — PHYSICAL EXAM
[General Appearance - Alert] : alert [General Appearance - In No Acute Distress] : in no acute distress [] : no respiratory distress [Auscultation Breath Sounds / Voice Sounds] : lungs were clear to auscultation bilaterally [Heart Rate And Rhythm] : heart rate was normal and rhythm regular [Heart Sounds] : normal S1 and S2 [Heart Sounds Gallop] : no gallops [Murmurs] : no murmurs [Heart Sounds Pericardial Friction Rub] : no pericardial rub [Edema] : there was no peripheral edema [No CVA Tenderness] : no ~M costovertebral angle tenderness [Oriented To Time, Place, And Person] : oriented to person, place, and time [Impaired Insight] : insight and judgment were intact [Affect] : the affect was normal

## 2021-09-26 NOTE — HISTORY OF PRESENT ILLNESS
[FreeTextEntry1] : 67 yo man here for f/u evaluation of HTN, hyperlipidemia, hyperuricemia/gout, MANJIT/CPAP, hep C, BPH, osteoarthritis; Edarbi chlor 40/12.5 mg was added 10/2020 in place of ramipril and torsemide\par s/p right TKR 2020. \par Feels that his home BP readings are good.\par Denies flank pain, dysuria, hematuria or frothy urine \par No SOB, CP.\par Not into regular exercise regimen as yet.

## 2021-10-06 ENCOUNTER — TRANSCRIPTION ENCOUNTER (OUTPATIENT)
Age: 66
End: 2021-10-06

## 2021-10-19 ENCOUNTER — RX RENEWAL (OUTPATIENT)
Age: 66
End: 2021-10-19

## 2021-11-15 ENCOUNTER — RX RENEWAL (OUTPATIENT)
Age: 66
End: 2021-11-15

## 2021-11-21 ENCOUNTER — RX RENEWAL (OUTPATIENT)
Age: 66
End: 2021-11-21

## 2021-12-02 ENCOUNTER — APPOINTMENT (OUTPATIENT)
Dept: NEPHROLOGY | Facility: CLINIC | Age: 66
End: 2021-12-02
Payer: COMMERCIAL

## 2021-12-02 VITALS — OXYGEN SATURATION: 98 % | SYSTOLIC BLOOD PRESSURE: 124 MMHG | DIASTOLIC BLOOD PRESSURE: 72 MMHG | HEART RATE: 78 BPM

## 2021-12-02 PROCEDURE — 99214 OFFICE O/P EST MOD 30 MIN: CPT

## 2021-12-02 RX ORDER — ATORVASTATIN CALCIUM 10 MG/1
10 TABLET, FILM COATED ORAL
Refills: 0 | Status: ACTIVE | COMMUNITY

## 2021-12-02 NOTE — HISTORY OF PRESENT ILLNESS
[FreeTextEntry1] : 65 yo man with HTN, hyperlipidemia, hyperuricemia/gout, MANJIT/CPAP, hep C, BPH, osteoarthritis, here for follow up evaluation.\par reports no change to meds- but is on atorvastatin which is not on list- added now.\par continues on  Edarbi chlor 40/12.5 mg was added 10/2020 in place of ramipril and torsemide\par s/p right TKR 2020- fully recovered- and will have left knee evaluated after new year\par  home BP readings are good.\par Denies flank pain, dysuria, hematuria or frothy urine \par No SOB, CP.\par

## 2021-12-02 NOTE — ASSESSMENT
[FreeTextEntry1] : all lab data was reviewed with patient in detail from 11/10/2021\par 67 yo man with HTN, CKD 3, hyperkalemia, hyperlipidemia, hyperuricemia/gout, MANJIT/CPAP, hep C, BPH, osteoarthritis\par creat 1.24- good egfr> 60\par -HTN- BP controlled- but does remember if he's taking spironolactone or not- last OV instructed to dc- \par he will check when he gets home and notify office; c/w his present regimen\par reminded to forward monthly BP readings to office.\par -hyperkalemia- K improved to 5.0- will notify us in regard to use of spironolactone (or not) c/w K limited diet\par -hyperlipidemia- c/w atorvastatin- LP next OV\par -elevated glucoses advised to reduce Etoh intake, exercise more, lose weight- will measure A1c with next OV- few years agon was 5.1%\par -overweight- encouraged weight loss and exercise\par \par f/u OV 4-6 months\par \par ADDENDUM 4:30p 12/2- pt says that he HAS been TAKING spironolactone- instructed to continue taking it since BP and K are at goal\par

## 2022-01-18 ENCOUNTER — RX RENEWAL (OUTPATIENT)
Age: 67
End: 2022-01-18

## 2022-03-22 ENCOUNTER — RX RENEWAL (OUTPATIENT)
Age: 67
End: 2022-03-22

## 2022-04-04 ENCOUNTER — APPOINTMENT (OUTPATIENT)
Dept: NEPHROLOGY | Facility: CLINIC | Age: 67
End: 2022-04-04
Payer: COMMERCIAL

## 2022-04-04 VITALS — HEIGHT: 72 IN | WEIGHT: 233 LBS | BODY MASS INDEX: 31.56 KG/M2

## 2022-04-04 VITALS — SYSTOLIC BLOOD PRESSURE: 118 MMHG | DIASTOLIC BLOOD PRESSURE: 63 MMHG

## 2022-04-04 DIAGNOSIS — N17.9 ACUTE KIDNEY FAILURE, UNSPECIFIED: ICD-10-CM

## 2022-04-04 PROCEDURE — 99214 OFFICE O/P EST MOD 30 MIN: CPT

## 2022-04-04 NOTE — HISTORY OF PRESENT ILLNESS
[FreeTextEntry1] : 65 yo man here for a f/u evaluation with CKD 3, hyperkalemia, HTN HLD MANJIT/CPAP\par patient had L knee replacement surgery around a week and a half ago has been doing in home PT; \par pre op chems revealed k 5/9- repeated was 5.1ish, per patient \par is still avoiding K rich foods\par has been having trouble sleeping at night thinks its positional\par not monitoring home BP, but was running in 110-125 range perioperatively\par Denies flank pain, dysuria, hematuria or frothy urine \par No SOB, CP.\par

## 2022-04-04 NOTE — ASSESSMENT
[FreeTextEntry1] : all lab data was reviewed with patient in detail from 3/22/2022 (from patients my portal)\par 65 yo man with HTN, CKD 3, hyperkalemia, hyperlipidemia, hyperuricemia/gout, MANJIT/CPAP, hep C, BPH, osteoarthritis\par -CKD 3- creat 1.3- stable egfr <56\par -HTN- BP a bit below goal; instructed patient to restart Home BP measurements.\par If running < 125 consider reduction of amlodipine (or hold amlodipine for a few days)- he will keep me apprised\par -hyperkalemia- K stable at  5.0-  c/w K limited diet- if feels that he wants to enjoy more K rich foods, offered that we can add potassium lowering agent\par -hyperlipidemia- c/w atorvastatin- \par -overweight- encouraged weight loss and exercise\par \par to f/u in 6 months\par \par \par

## 2022-04-12 ENCOUNTER — NON-APPOINTMENT (OUTPATIENT)
Age: 67
End: 2022-04-12

## 2022-06-16 ENCOUNTER — NON-APPOINTMENT (OUTPATIENT)
Age: 67
End: 2022-06-16

## 2022-07-19 ENCOUNTER — NON-APPOINTMENT (OUTPATIENT)
Age: 67
End: 2022-07-19

## 2022-07-20 ENCOUNTER — APPOINTMENT (OUTPATIENT)
Dept: HEMATOLOGY ONCOLOGY | Facility: CLINIC | Age: 67
End: 2022-07-20

## 2022-07-20 ENCOUNTER — LABORATORY RESULT (OUTPATIENT)
Age: 67
End: 2022-07-20

## 2022-07-20 VITALS
WEIGHT: 228 LBS | SYSTOLIC BLOOD PRESSURE: 142 MMHG | HEART RATE: 87 BPM | OXYGEN SATURATION: 96 % | TEMPERATURE: 97.8 F | BODY MASS INDEX: 30.88 KG/M2 | DIASTOLIC BLOOD PRESSURE: 82 MMHG | HEIGHT: 72 IN

## 2022-07-20 DIAGNOSIS — R79.0 ABNORMAL LVL OF BLOOD MINERAL: ICD-10-CM

## 2022-07-20 DIAGNOSIS — Z98.890 PRECIPITOUS DROP IN HEMATOCRIT: ICD-10-CM

## 2022-07-20 DIAGNOSIS — D64.9 ANEMIA, UNSPECIFIED: ICD-10-CM

## 2022-07-20 DIAGNOSIS — E53.8 DEFICIENCY OF OTHER SPECIFIED B GROUP VITAMINS: ICD-10-CM

## 2022-07-20 DIAGNOSIS — R71.0 PRECIPITOUS DROP IN HEMATOCRIT: ICD-10-CM

## 2022-07-20 LAB
RBC # BLD: 4.71 M/UL
RETICS # AUTO: 2 %
RETICS AGGREG/RBC NFR: 94.2 K/UL

## 2022-07-20 PROCEDURE — 36415 COLL VENOUS BLD VENIPUNCTURE: CPT

## 2022-07-20 PROCEDURE — 96372 THER/PROPH/DIAG INJ SC/IM: CPT

## 2022-07-20 PROCEDURE — 99205 OFFICE O/P NEW HI 60 MIN: CPT | Mod: 25

## 2022-07-20 RX ORDER — CYANOCOBALAMIN 1000 UG/ML
1000 INJECTION INTRAMUSCULAR; SUBCUTANEOUS
Qty: 0 | Refills: 0 | Status: COMPLETED | OUTPATIENT
Start: 2022-07-20

## 2022-07-20 RX ORDER — AZILSARTAN KAMEDOXOMIL AND CHLORTHALIDONE 40; 12.5 MG/1; MG/1
40-12.5 TABLET ORAL DAILY
Qty: 30 | Refills: 0 | Status: DISCONTINUED | COMMUNITY
Start: 2020-10-12 | End: 2022-07-20

## 2022-07-20 RX ORDER — RIVAROXABAN 10 MG/1
10 TABLET, FILM COATED ORAL
Qty: 28 | Refills: 0 | Status: DISCONTINUED | COMMUNITY
Start: 2022-03-24 | End: 2022-07-20

## 2022-07-20 RX ORDER — SPIRONOLACTONE 25 MG/1
25 TABLET ORAL
Qty: 30 | Refills: 0 | Status: DISCONTINUED | COMMUNITY
Start: 2020-08-20 | End: 2022-07-20

## 2022-07-20 RX ADMIN — CYANOCOBALAMIN 1 MCG/ML: 1000 INJECTION INTRAMUSCULAR; SUBCUTANEOUS at 00:00

## 2022-07-20 NOTE — ASSESSMENT
[FreeTextEntry1] : The patient is a 67 year old male with a history of hypertension, hyperlipemia, hepatitis C, osteoarthritis status post bilateral knee replacements, COVID-19 infection in July of 2022,  who is referred for evaluation of anemia.  It appears that anemia was due to perioperative blood loss and blood loss due to epistaxis while on Xarelto for DVT prophylaxis, but will evaluate for other causes.  Low-level B12 will be further evaluated in this patient of [place]  descent.  B12 deficiency may also be due to the fact that patient rarely eats red meat and restricts his diet.  Have ordered CBC, reticulocyte count, manual differential, comprehensive metabolic panel, B12, folate, iron panel, ferritin, intrinsic factor antibody, parietal cell antibody, ARACELI, rheumatoid factor, protein electrophoresis, LDH and haptoglobin.  B12 1000 mcg was administered intramuscularly to the right deltoid without incident.  The patient was advised to call the office to discuss results and further management.

## 2022-07-20 NOTE — HISTORY OF PRESENT ILLNESS
[de-identified] :  Patient is a 67 year old male with a history of hypertension, hyperlipemia, hepatitis C, osteoarthritis status post bilateral knee replacements, COVID-19 infection in July of 2022,  who is referred for evaluation of anemia. Patient had left knee replacement in March of 2022 after which in April the hemoglobin was 9.7, hematocrit was 28.9.  Postoperatively patient was placed on Xarelto for DVT prophylaxis and admits to having an episode of severe epistaxis. Xarelto was discontinued. The patient saw his PCP in June, hemoglobin improved to 13.0, with a hematocrit of 39.6 normal WBC at 5.9 and platelet count of 214,000. Also at that time iron saturation was low at 19% but with a ferritin on 133. B12 was on the low end of normal at 329 and folate was normal at greater than 24.0. Patient recently had COVID-19 infection (early July) at which time he had sore throat and runny nose for 3 days.  He has since tested negative by PCR.  He feels generally well at this time with no acute complaints. Denies fever, fatigue, dizziness, chills, shortness of breath, chest pain, abdominal pain, blood in the urine of stool, or other infections.

## 2022-07-20 NOTE — PHYSICAL EXAM
[Obese] : obese [Normal] : affect appropriate [de-identified] : obese [de-identified] : s/p bilateral knee replacements [de-identified] : Tanned, multiple tattoos

## 2022-07-20 NOTE — CONSULT LETTER
[Dear  ___] : Dear  [unfilled], [Consult Letter:] : I had the pleasure of evaluating your patient, [unfilled]. [( Thank you for referring [unfilled] for consultation for _____ )] : Thank you for referring [unfilled] for consultation for [unfilled] [Please see my note below.] : Please see my note below. [Consult Closing:] : Thank you very much for allowing me to participate in the care of this patient.  If you have any questions, please do not hesitate to contact me. [Sincerely,] : Sincerely, [FreeTextEntry3] : Sharlene Ramires

## 2022-07-21 LAB
ALBUMIN SERPL ELPH-MCNC: 4.9 G/DL
ALP BLD-CCNC: 89 U/L
ALT SERPL-CCNC: 34 U/L
ANION GAP SERPL CALC-SCNC: 13 MMOL/L
AST SERPL-CCNC: 58 U/L
BASOPHILS # BLD AUTO: 0.05 K/UL
BASOPHILS # BLD AUTO: 0.05 K/UL
BASOPHILS NFR BLD AUTO: 0.9 %
BASOPHILS NFR BLD AUTO: 0.9 %
BILIRUB SERPL-MCNC: 0.7 MG/DL
BUN SERPL-MCNC: 20 MG/DL
CALCIUM SERPL-MCNC: 10.1 MG/DL
CHLORIDE SERPL-SCNC: 102 MMOL/L
CO2 SERPL-SCNC: 24 MMOL/L
CREAT SERPL-MCNC: 0.92 MG/DL
EGFR: 92 ML/MIN/1.73M2
EOSINOPHIL # BLD AUTO: 0.05 K/UL
EOSINOPHIL # BLD AUTO: 0.05 K/UL
EOSINOPHIL NFR BLD AUTO: 0.9 %
EOSINOPHIL NFR BLD AUTO: 0.9 %
FERRITIN SERPL-MCNC: 200 NG/ML
FOLATE SERPL-MCNC: >20 NG/ML
GIANT PLATELETS BLD QL SMEAR: PRESENT
GLUCOSE SERPL-MCNC: 97 MG/DL
HAPTOGLOB SERPL-MCNC: 113 MG/DL
HCT VFR BLD CALC: 41.4 %
HGB BLD-MCNC: 13.8 G/DL
IRON SATN MFR SERPL: 23 %
IRON SERPL-MCNC: 73 UG/DL
LDH SERPL-CCNC: 191 U/L
LYMPHOCYTES # BLD AUTO: 1.69 K/UL
LYMPHOCYTES # BLD AUTO: 1.69 K/UL
LYMPHOCYTES NFR BLD AUTO: 30.7 %
LYMPHOCYTES NFR BLD AUTO: 30.7 %
MAN DIFF?: NORMAL
MCHC RBC-ENTMCNC: 29.3 PG
MCHC RBC-ENTMCNC: 33.3 GM/DL
MCV RBC AUTO: 87.9 FL
MONOCYTES # BLD AUTO: 0.29 K/UL
MONOCYTES # BLD AUTO: 0.29 K/UL
MONOCYTES NFR BLD AUTO: 5.2 %
MONOCYTES NFR BLD AUTO: 5.2 %
MSMEAR: NORMAL
NEUTROPHILS # BLD AUTO: 3.43 K/UL
NEUTROPHILS # BLD AUTO: 3.43 K/UL
NEUTROPHILS NFR BLD AUTO: 62.3 %
NEUTROPHILS NFR BLD AUTO: 62.3 %
PLAT MORPH BLD: NORMAL
PLATELET # BLD AUTO: 250 K/UL
POTASSIUM SERPL-SCNC: 5 MMOL/L
PROT SERPL-MCNC: 7.6 G/DL
RBC # BLD: 4.71 M/UL
RBC # FLD: 13.6 %
RBC BLD AUTO: NORMAL
RHEUMATOID FACT SER QL: <10 IU/ML
SODIUM SERPL-SCNC: 140 MMOL/L
TIBC SERPL-MCNC: 320 UG/DL
UIBC SERPL-MCNC: 247 UG/DL
VIT B12 SERPL-MCNC: 360 PG/ML
WBC # FLD AUTO: 5.51 K/UL

## 2022-07-24 LAB — PCA AB SER QL IF: NORMAL

## 2022-07-25 LAB — IF BLOCK AB SER QL: 1 AU/ML

## 2022-07-26 LAB
ALBUMIN MFR SERPL ELPH: 58.2 %
ALBUMIN SERPL-MCNC: 4.4 G/DL
ALBUMIN/GLOB SERPL: 1.4 RATIO
ALPHA1 GLOB MFR SERPL ELPH: 3.5 %
ALPHA1 GLOB SERPL ELPH-MCNC: 0.3 G/DL
ALPHA2 GLOB MFR SERPL ELPH: 10 %
ALPHA2 GLOB SERPL ELPH-MCNC: 0.8 G/DL
B-GLOBULIN MFR SERPL ELPH: 10.6 %
B-GLOBULIN SERPL ELPH-MCNC: 0.8 G/DL
GAMMA GLOB FLD ELPH-MCNC: 1.3 G/DL
GAMMA GLOB MFR SERPL ELPH: 17.7 %
INTERPRETATION SERPL IEP-IMP: NORMAL
PROT SERPL-MCNC: 7.6 G/DL
PROT SERPL-MCNC: 7.6 G/DL

## 2022-07-28 LAB — ANA SER IF-ACNC: NEGATIVE

## 2022-08-05 ENCOUNTER — RX RENEWAL (OUTPATIENT)
Age: 67
End: 2022-08-05

## 2022-10-19 ENCOUNTER — APPOINTMENT (OUTPATIENT)
Dept: NEPHROLOGY | Facility: CLINIC | Age: 67
End: 2022-10-19

## 2022-10-19 VITALS
HEART RATE: 82 BPM | DIASTOLIC BLOOD PRESSURE: 86 MMHG | BODY MASS INDEX: 31.87 KG/M2 | WEIGHT: 235 LBS | SYSTOLIC BLOOD PRESSURE: 152 MMHG

## 2022-10-19 PROCEDURE — 99214 OFFICE O/P EST MOD 30 MIN: CPT

## 2022-10-19 NOTE — ASSESSMENT
[FreeTextEntry1] : all lab data was reviewed with patient in detail from  10/17/2022\par 68 yo man with HTN, CKD 3, hyperkalemia, hyperlipidemia, hyperuricemia/gout, MANJIT/CPAP, hep C, BPH, osteoarthritis\par -CKD 3- creat  .92- good within known range .9-1.35; off RASi for now may be reflected in this lower creat\par -HTN- BP uncontrolled- he prefers trial of lifestyle modification- he likes being on less medication-\par okay to try- f/u OV Jan-Feb- for BP check if no better increase BP meds.\par Also instructed him to measure his BP 2 consecutive days each month- Days 1,2 of each month\par -hyperkalemia- K 4.4- improved off RASi for now- c/w low K diet for now\par -proteinuria- low grade off RASi-  as above, working on lifestyle modification and weight loss- restart RASi next OV if off goal\par -hyperlipidemia- c/w atorvastatin- \par -overweight- emphasized need to reduce his weight to optimize BP and reduce proteinuria \par -transaminitis- mild- following with PCP/GI\par \par to f/u 3-4 months\par \par \par

## 2022-10-19 NOTE — HISTORY OF PRESENT ILLNESS
[FreeTextEntry1] : 66 yo man with CKD 3, hyperkalemia, HTN HLD MANJIT/CPAP, here for follow up evaluation\par s/p 3/2022 L TKR\par now fully recovered from his knee surgery- much more active\par BPs were low post op and into April/May - held edarbyclor, spirolactone and metoprolol- \par on amlodipine 10 mg as his only BP med at this time\par now reports that his BPs trending up a bit at other MD visits- has not measured at home\par avoiding K rich foods\par weight up 8-10 pounds\par Denies flank pain, dysuria, hematuria or frothy urine \par No SOB, CP.\par

## 2023-01-07 NOTE — PHYSICAL THERAPY INITIAL EVALUATION ADULT - AMBULATION SKILLS, REHAB EVAL
independent - diagnosed about 8 yrs ago  - follows with Dr. 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

## 2023-02-22 ENCOUNTER — APPOINTMENT (OUTPATIENT)
Dept: NEPHROLOGY | Facility: CLINIC | Age: 68
End: 2023-02-22
Payer: COMMERCIAL

## 2023-02-22 VITALS
HEART RATE: 76 BPM | DIASTOLIC BLOOD PRESSURE: 86 MMHG | BODY MASS INDEX: 32.41 KG/M2 | SYSTOLIC BLOOD PRESSURE: 154 MMHG | WEIGHT: 239 LBS

## 2023-02-22 PROCEDURE — 99214 OFFICE O/P EST MOD 30 MIN: CPT

## 2023-02-22 RX ORDER — GINKGO BILOBA LEAF EXTRACT 120 MG
250 CAPSULE ORAL
Refills: 0 | Status: DISCONTINUED | COMMUNITY
End: 2023-02-22

## 2023-02-22 NOTE — HISTORY OF PRESENT ILLNESS
[FreeTextEntry1] : 66 yo man here for f/u evaluation of CKD 3, hyperkalemia, HTN HLD MANJIT/CPAP.\par weight up a bit\par BPs were low post op and into April/May - held edarbyclor, spirolactone and metoprolol- \par on amlodipine 10 mg as his only BP med at this time\par BPs trending up a bit \par Denies flank pain, dysuria, hematuria or frothy urine \par No SOB, CP.\par \par s/p 3/2022 L TKR\par

## 2023-02-22 NOTE — ASSESSMENT
[FreeTextEntry1] : all lab data was reviewed with patient in detail from 2/17/2023\par 66 yo man with HTN, CKD 3, hyperkalemia, hyperlipidemia, hyperuricemia/gout, MANJIT/CPAP, hep C, BPH, \par -HTN- BP above goal- reviewed home BP monitoring technique: seated position, arm supported on table- 3 measurements 2-5 minutes apart- record lowest BP -forward reading to office each month\par reviewed target is 1330/80\par if no improvement with lifestyle modification restart prior BP meds\par -CKD 3- creat  .91-  very good.  range .9-1.35; off RASi for now may be reflected in this lower creat\par -hyperkalemia- K 4.6- good- c/w low K diet for now\par -proteinuria- Ualb/creat 282- if no better once BP better, consider addition of sglt2i; anticipate that he will need to restart RASi soon\par await BP trends\par -hyperlipidemia- LP at goal; c/w atorvastatin- \par -overweight- rediscussed need to reduce weight \par -transaminitis- normalized except mild increase AST to 65 - he is following with PCP/GI\par \par f/u 6 months\par but expect monthly BP readings \par \par \par \par \par \par \par \par \par to f/u 3-4 months\par \par \par

## 2023-03-02 ENCOUNTER — NON-APPOINTMENT (OUTPATIENT)
Age: 68
End: 2023-03-02

## 2023-05-04 ENCOUNTER — RX RENEWAL (OUTPATIENT)
Age: 68
End: 2023-05-04

## 2023-08-30 ENCOUNTER — APPOINTMENT (OUTPATIENT)
Dept: NEPHROLOGY | Facility: CLINIC | Age: 68
End: 2023-08-30
Payer: COMMERCIAL

## 2023-08-30 VITALS
DIASTOLIC BLOOD PRESSURE: 82 MMHG | HEART RATE: 78 BPM | WEIGHT: 237 LBS | BODY MASS INDEX: 32.14 KG/M2 | SYSTOLIC BLOOD PRESSURE: 156 MMHG

## 2023-08-30 PROCEDURE — 99214 OFFICE O/P EST MOD 30 MIN: CPT

## 2023-09-01 NOTE — ASSESSMENT
[FreeTextEntry1] : all lab data was reviewed with patient in detail from 8/24/2023 67 yo man with HTN, CKD 3, hyperkalemia, hyperlipidemia, hyperuricemia/gout, MANJIT/CPAP, hep C, BPH,  -HTN- BP above goal- reviewed home BP monitoring technique: seated position, arm supported on table- 3 measurements 2-5 minutes apart- record lowest BP and bring reading to next visit.  reviewed target is 130/80 Fow now to continue with Amlodipine and will restart Ramapril 5mg, will follow up labs in 6 weeks, sCr stable and K+ with in limits will increase to 10mg for better BP control  -CKD 3- creat  .91-  very good.  range .9- -proteinuria- Ualb/creat  331, elevated from last visit, restart on Ramapril, will follow up labs and adjust dose  consider sglt2i -hyperlipidemia- LP at goal; c/w atorvastatin-  -overweight- rediscussed need to reduce weight, diet exercise    f/u 3 months Leonela Morales PGY 5 Nephrology Fellow

## 2023-09-01 NOTE — PHYSICAL EXAM
[General Appearance - Alert] : alert [General Appearance - In No Acute Distress] : in no acute distress [] : no respiratory distress [Auscultation Breath Sounds / Voice Sounds] : lungs were clear to auscultation bilaterally [Heart Rate And Rhythm] : heart rate was normal and rhythm regular [Heart Sounds] : normal S1 and S2 [Heart Sounds Gallop] : no gallops [Murmurs] : no murmurs [Heart Sounds Pericardial Friction Rub] : no pericardial rub [Edema] : there was no peripheral edema [No CVA Tenderness] : no ~M costovertebral angle tenderness [Oriented To Time, Place, And Person] : oriented to person, place, and time [Impaired Insight] : insight and judgment were intact [Affect] : the affect was normal [Sclera] : the sclera and conjunctiva were normal [PERRL With Normal Accommodation] : pupils were equal in size, round, and reactive to light [Neck Appearance] : the appearance of the neck was normal [FreeTextEntry1] : tip of nose with cream

## 2023-09-01 NOTE — HISTORY OF PRESENT ILLNESS
[FreeTextEntry1] : 69 yo man here for f/u evaluation of CKD 3, hyperkalemia, HTN HLD MANJIT/CPAP. Feels well overall, staying active, playing golf and bike riding weight down 2 pounds  Melanoma removed last week  benign  BPs trending up a bit, only on Amlodipine, weight stable, states drinking a bit more  Denies flank pain, dysuria, hematuria or frothy urine  No SOB, CP.

## 2023-09-01 NOTE — END OF VISIT
[] : Fellow [FreeTextEntry3] : seen and examined with Dr. Morales BP above target- have restarted ramipril- trend creat and K may benefit from sglt2i emphasized need to lose weight

## 2023-11-29 ENCOUNTER — APPOINTMENT (OUTPATIENT)
Dept: NEPHROLOGY | Facility: CLINIC | Age: 68
End: 2023-11-29
Payer: COMMERCIAL

## 2023-11-29 VITALS
SYSTOLIC BLOOD PRESSURE: 142 MMHG | HEART RATE: 80 BPM | WEIGHT: 239 LBS | BODY MASS INDEX: 32.41 KG/M2 | DIASTOLIC BLOOD PRESSURE: 82 MMHG

## 2023-11-29 DIAGNOSIS — M10.9 GOUT, UNSPECIFIED: ICD-10-CM

## 2023-11-29 DIAGNOSIS — E78.5 HYPERLIPIDEMIA, UNSPECIFIED: ICD-10-CM

## 2023-11-29 PROCEDURE — 99214 OFFICE O/P EST MOD 30 MIN: CPT

## 2024-02-06 ENCOUNTER — RX RENEWAL (OUTPATIENT)
Age: 69
End: 2024-02-06

## 2024-02-06 RX ORDER — RAMIPRIL 10 MG/1
10 CAPSULE ORAL DAILY
Qty: 90 | Refills: 3 | Status: ACTIVE | COMMUNITY
Start: 2023-11-29 | End: 1900-01-01

## 2024-02-15 ENCOUNTER — APPOINTMENT (OUTPATIENT)
Dept: NEPHROLOGY | Facility: CLINIC | Age: 69
End: 2024-02-15
Payer: COMMERCIAL

## 2024-02-15 VITALS
HEART RATE: 78 BPM | SYSTOLIC BLOOD PRESSURE: 146 MMHG | DIASTOLIC BLOOD PRESSURE: 80 MMHG | WEIGHT: 240 LBS | BODY MASS INDEX: 32.55 KG/M2

## 2024-02-15 PROCEDURE — G2211 COMPLEX E/M VISIT ADD ON: CPT

## 2024-02-15 PROCEDURE — 99214 OFFICE O/P EST MOD 30 MIN: CPT

## 2024-02-15 RX ORDER — RAMIPRIL 5 MG/1
5 CAPSULE ORAL DAILY
Qty: 90 | Refills: 2 | Status: DISCONTINUED | COMMUNITY
Start: 2023-08-30 | End: 2024-02-15

## 2024-02-15 NOTE — PHYSICAL EXAM
[General Appearance - Alert] : alert [General Appearance - In No Acute Distress] : in no acute distress [Sclera] : the sclera and conjunctiva were normal [PERRL With Normal Accommodation] : pupils were equal in size, round, and reactive to light [Neck Appearance] : the appearance of the neck was normal [] : no respiratory distress [Auscultation Breath Sounds / Voice Sounds] : lungs were clear to auscultation bilaterally [Heart Rate And Rhythm] : heart rate was normal and rhythm regular [Heart Sounds] : normal S1 and S2 [Heart Sounds Gallop] : no gallops [Murmurs] : no murmurs [Heart Sounds Pericardial Friction Rub] : no pericardial rub [Edema] : there was no peripheral edema [No CVA Tenderness] : no ~M costovertebral angle tenderness [Oriented To Time, Place, And Person] : oriented to person, place, and time [Impaired Insight] : insight and judgment were intact [Affect] : the affect was normal [FreeTextEntry1] : tip of nose with cream

## 2024-02-16 NOTE — ASSESSMENT
[FreeTextEntry1] : all lab data was reviewed with patient in detail from  2/12/2024 69 yo man with HTN, CKD 3, hyperkalemia, hyperlipidemia, hyperuricemia/gout, MANJIT/CPAP, hep C, BPH. -HTN- BP above goal- restart HCTZ reviewed target is 130/80 repeat data and BP check 2 months TTM -proteinuria-  trending down- Ualb/creat 203; Uprot/creat 457- /w ramipril;  discussed potential need to add sglt2i -CKD 3- creat.93-  good. -hyperlipidemia- LP at goal; c/w atorvastatin- -overweight- rediscussed need to reduce weight, diet exercise   OV in office 4-5 months  -

## 2024-02-16 NOTE — HISTORY OF PRESENT ILLNESS
[FreeTextEntry1] : 67 yo man with CKD 3, hyperkalemia, HTN HLD MANJIT/CPAP, for follow up evaluation. last OV 11/2023, BP was above goal- started home BP monitoring- was working on lifestyle modification prior to increase in meds has not made progress in weight loss or exercise- "lots going on" home BPs above goal- reviewed now with pt reminded to forward his BP measurements monthly. Denies flank pain, dysuria, hematuria or frothy urine  No SOB, CP.

## 2024-04-10 ENCOUNTER — RX RENEWAL (OUTPATIENT)
Age: 69
End: 2024-04-10

## 2024-06-06 ENCOUNTER — APPOINTMENT (OUTPATIENT)
Dept: NEPHROLOGY | Facility: CLINIC | Age: 69
End: 2024-06-06
Payer: COMMERCIAL

## 2024-06-06 VITALS
DIASTOLIC BLOOD PRESSURE: 78 MMHG | HEART RATE: 78 BPM | SYSTOLIC BLOOD PRESSURE: 137 MMHG | WEIGHT: 237 LBS | BODY MASS INDEX: 32.14 KG/M2

## 2024-06-06 DIAGNOSIS — R73.09 OTHER ABNORMAL GLUCOSE: ICD-10-CM

## 2024-06-06 DIAGNOSIS — R80.9 PROTEINURIA, UNSPECIFIED: ICD-10-CM

## 2024-06-06 DIAGNOSIS — N18.30 CHRONIC KIDNEY DISEASE, STAGE 3 UNSPECIFIED: ICD-10-CM

## 2024-06-06 DIAGNOSIS — E87.5 HYPERKALEMIA: ICD-10-CM

## 2024-06-06 DIAGNOSIS — I10 ESSENTIAL (PRIMARY) HYPERTENSION: ICD-10-CM

## 2024-06-06 PROCEDURE — G2211 COMPLEX E/M VISIT ADD ON: CPT

## 2024-06-06 PROCEDURE — 99214 OFFICE O/P EST MOD 30 MIN: CPT

## 2024-06-06 NOTE — PHYSICAL EXAM
[General Appearance - Alert] : alert [General Appearance - In No Acute Distress] : in no acute distress [] : no respiratory distress [Auscultation Breath Sounds / Voice Sounds] : lungs were clear to auscultation bilaterally [Heart Rate And Rhythm] : heart rate was normal and rhythm regular [Edema] : there was no peripheral edema [Heart Sounds] : normal S1 and S2 [No CVA Tenderness] : no ~M costovertebral angle tenderness [Oriented To Time, Place, And Person] : oriented to person, place, and time [Impaired Insight] : insight and judgment were intact [Affect] : the affect was normal

## 2024-06-08 PROBLEM — R80.9 PROTEINURIA, UNSPECIFIED TYPE: Status: ACTIVE | Noted: 2022-10-19

## 2024-06-08 NOTE — ASSESSMENT
[FreeTextEntry1] : all lab data was reviewed with patient in detail from 6/4/2024 68 yo man with HTN, CKD 3, hyperkalemia, hyperlipidemia, hyperuricemia/gout, MANJIT/CPAP, hep C, BPH. -HTN- BP not at goal-  thinks he can get his weight down and increase exercise over next few months reviewed target is 130/80 repeat data and BP check 2 months TTM -proteinuria-   from 203; Uprot/creat 380 from  457    c/w ramipril; optimize BP discussed need to add sglt2i if does not improve -CKD 3- creat .87- excellent -hyperlipidemia- c/w atorvastatin- -overweight- rediscussed need to reduce weight, diet exercise  f/u OV 2 months

## 2024-06-08 NOTE — HISTORY OF PRESENT ILLNESS
[FreeTextEntry1] : 68 yo man here for f/u evaluation of CKD 3, hyperkalemia, HTN HLD MANJIT/CPAP. last OV BP was above goal so we added HCTZ- taking home BPs running 130-150 weight  237 down 3 pounds- he has a plan to drop weight 20 pounds over next 3 months-  reminded to forward his BP measurements monthly. Denies flank pain, dysuria, hematuria or frothy urine  No SOB, CP.

## 2024-08-28 ENCOUNTER — APPOINTMENT (OUTPATIENT)
Dept: NEPHROLOGY | Facility: CLINIC | Age: 69
End: 2024-08-28
Payer: COMMERCIAL

## 2024-08-28 VITALS
HEART RATE: 78 BPM | DIASTOLIC BLOOD PRESSURE: 76 MMHG | WEIGHT: 235 LBS | BODY MASS INDEX: 31.87 KG/M2 | SYSTOLIC BLOOD PRESSURE: 134 MMHG

## 2024-08-28 DIAGNOSIS — N18.30 CHRONIC KIDNEY DISEASE, STAGE 3 UNSPECIFIED: ICD-10-CM

## 2024-08-28 DIAGNOSIS — I10 ESSENTIAL (PRIMARY) HYPERTENSION: ICD-10-CM

## 2024-08-28 DIAGNOSIS — R73.09 OTHER ABNORMAL GLUCOSE: ICD-10-CM

## 2024-08-28 DIAGNOSIS — E78.5 HYPERLIPIDEMIA, UNSPECIFIED: ICD-10-CM

## 2024-08-28 DIAGNOSIS — R80.9 PROTEINURIA, UNSPECIFIED: ICD-10-CM

## 2024-08-28 DIAGNOSIS — R74.8 ABNORMAL LEVELS OF OTHER SERUM ENZYMES: ICD-10-CM

## 2024-08-28 PROCEDURE — 99214 OFFICE O/P EST MOD 30 MIN: CPT

## 2024-08-28 PROCEDURE — G2211 COMPLEX E/M VISIT ADD ON: CPT

## 2024-08-28 NOTE — HISTORY OF PRESENT ILLNESS
[FreeTextEntry1] : 70 yo man with CKD 3, hyperkalemia, HTN HLD MANJIT/CPAP, for follow up evaluation last OV, after addition of HCTZ, BPs were improving but not yet at goal, but felt that he was in a better position to optimize weight and exercise today reports he has modified diet and exercise- no significant weight loss yet feels well reminded to forward his BP measurements monthly. Denies flank pain, dysuria, hematuria or frothy urine  No SOB, CP.

## 2024-08-28 NOTE — ASSESSMENT
[FreeTextEntry1] : all lab data was reviewed with patient in detail from 8/22/2024 70 yo man with HTN, CKD 3, hyperkalemia, hyperlipidemia, hyperuricemia/gout, MANJIT/CPAP, h/o  hep C,  mild rise in AST, h/o BPH. -HTN- BP improving- okay to defer med adjustment will forward readings - adjust as needed  working on weight loss -proteinuria-  UAC down again to 96, 184 prior and 203 prior that. Uprot/creat  down to 247;  prior: 380 from  457    c/w ramipril; optimize BP okay to defer sglt2i for now -CKD 3- creat .96- very good- electrolytes okay -hyperlipidemia- LP at goal- c/w atorvastatin- -elevated AST- referred to GI -overweight- rediscussed need to reduce weight, diet exercise  f/u OV 4-5 months

## 2024-10-07 ENCOUNTER — LABORATORY RESULT (OUTPATIENT)
Age: 69
End: 2024-10-07

## 2024-10-07 ENCOUNTER — APPOINTMENT (OUTPATIENT)
Dept: HEPATOLOGY | Facility: CLINIC | Age: 69
End: 2024-10-07
Payer: COMMERCIAL

## 2024-10-07 VITALS
TEMPERATURE: 98.1 F | HEIGHT: 72 IN | RESPIRATION RATE: 16 BRPM | HEART RATE: 66 BPM | OXYGEN SATURATION: 95 % | WEIGHT: 240.74 LBS | SYSTOLIC BLOOD PRESSURE: 158 MMHG | DIASTOLIC BLOOD PRESSURE: 88 MMHG | BODY MASS INDEX: 32.61 KG/M2

## 2024-10-07 DIAGNOSIS — B19.20 UNSPECIFIED VIRAL HEPATITIS C W/OUT HEPATIC COMA: ICD-10-CM

## 2024-10-07 DIAGNOSIS — R79.0 ABNORMAL LVL OF BLOOD MINERAL: ICD-10-CM

## 2024-10-07 DIAGNOSIS — R74.8 ABNORMAL LEVELS OF OTHER SERUM ENZYMES: ICD-10-CM

## 2024-10-07 LAB — TRANSFERRIN SERPL-MCNC: 317 MG/DL

## 2024-10-07 PROCEDURE — 76981 USE PARENCHYMA: CPT

## 2024-10-07 PROCEDURE — 99204 OFFICE O/P NEW MOD 45 MIN: CPT

## 2024-10-08 LAB
AFP-TM SERPL-MCNC: <1.8 NG/ML
FERRITIN SERPL-MCNC: 212 NG/ML
HBV CORE IGG+IGM SER QL: NONREACTIVE
HBV SURFACE AB SER QL: NONREACTIVE
HBV SURFACE AG SER QL: NONREACTIVE
IRON SATN MFR SERPL: 19 %
IRON SERPL-MCNC: 77 UG/DL
TIBC SERPL-MCNC: 394 UG/DL
UIBC SERPL-MCNC: 318 UG/DL

## 2024-10-10 LAB — HCV GENTYP BLD NAA+PROBE: NOT DETECTED

## 2024-10-11 LAB
ALBUMIN SERPL ELPH-MCNC: 4.8 G/DL
ALP BLD-CCNC: 77 U/L
ALT SERPL-CCNC: 34 U/L
ANION GAP SERPL CALC-SCNC: 12 MMOL/L
AST SERPL-CCNC: 59 U/L
BILIRUB SERPL-MCNC: 0.7 MG/DL
BUN SERPL-MCNC: 22 MG/DL
CALCIUM SERPL-MCNC: 10.1 MG/DL
CHLORIDE SERPL-SCNC: 100 MMOL/L
CO2 SERPL-SCNC: 27 MMOL/L
CREAT SERPL-MCNC: 0.95 MG/DL
EGFR: 88 ML/MIN/1.73M2
GLUCOSE SERPL-MCNC: 101 MG/DL
HCT VFR BLD CALC: 45.9 %
HCV AB SER QL: REACTIVE
HCV S/CO RATIO: 5.77 S/CO
HGB BLD-MCNC: 15.8 G/DL
MCHC RBC-ENTMCNC: 31.1 PG
MCHC RBC-ENTMCNC: 34.4 GM/DL
MCV RBC AUTO: 90.4 FL
PLATELET # BLD AUTO: 240 K/UL
POTASSIUM SERPL-SCNC: 4.9 MMOL/L
PROT SERPL-MCNC: 7.9 G/DL
RBC # BLD: 5.08 M/UL
RBC # FLD: 13.4 %
SODIUM SERPL-SCNC: 139 MMOL/L
WBC # FLD AUTO: 7.7 K/UL

## 2024-10-14 LAB
PETH 16:0/18:1: NORMAL
PETH 16:0/18:2: NORMAL
PETH COMMENTS: NORMAL

## 2024-11-11 ENCOUNTER — APPOINTMENT (OUTPATIENT)
Dept: HEPATOLOGY | Facility: CLINIC | Age: 69
End: 2024-11-11
Payer: COMMERCIAL

## 2024-11-11 VITALS
WEIGHT: 240 LBS | TEMPERATURE: 98 F | BODY MASS INDEX: 32.51 KG/M2 | DIASTOLIC BLOOD PRESSURE: 81 MMHG | HEART RATE: 63 BPM | OXYGEN SATURATION: 96 % | SYSTOLIC BLOOD PRESSURE: 168 MMHG | HEIGHT: 72 IN | RESPIRATION RATE: 16 BRPM

## 2024-11-11 DIAGNOSIS — R79.0 ABNORMAL LVL OF BLOOD MINERAL: ICD-10-CM

## 2024-11-11 DIAGNOSIS — B19.20 UNSPECIFIED VIRAL HEPATITIS C W/OUT HEPATIC COMA: ICD-10-CM

## 2024-11-11 PROCEDURE — 99214 OFFICE O/P EST MOD 30 MIN: CPT

## 2024-11-11 PROCEDURE — G2211 COMPLEX E/M VISIT ADD ON: CPT | Mod: NC

## 2025-01-08 ENCOUNTER — RX RENEWAL (OUTPATIENT)
Age: 70
End: 2025-01-08

## 2025-01-09 ENCOUNTER — APPOINTMENT (OUTPATIENT)
Dept: NEPHROLOGY | Facility: CLINIC | Age: 70
End: 2025-01-09
Payer: COMMERCIAL

## 2025-01-09 VITALS
HEART RATE: 68 BPM | BODY MASS INDEX: 32.28 KG/M2 | SYSTOLIC BLOOD PRESSURE: 126 MMHG | WEIGHT: 238 LBS | DIASTOLIC BLOOD PRESSURE: 68 MMHG

## 2025-01-09 DIAGNOSIS — E74.39 OTHER DISORDERS OF INTESTINAL CARBOHYDRATE ABSORPTION: ICD-10-CM

## 2025-01-09 DIAGNOSIS — E87.5 HYPERKALEMIA: ICD-10-CM

## 2025-01-09 DIAGNOSIS — R80.9 PROTEINURIA, UNSPECIFIED: ICD-10-CM

## 2025-01-09 DIAGNOSIS — E66.3 OVERWEIGHT: ICD-10-CM

## 2025-01-09 DIAGNOSIS — N18.30 CHRONIC KIDNEY DISEASE, STAGE 3 UNSPECIFIED: ICD-10-CM

## 2025-01-09 DIAGNOSIS — I10 ESSENTIAL (PRIMARY) HYPERTENSION: ICD-10-CM

## 2025-01-09 DIAGNOSIS — M10.9 GOUT, UNSPECIFIED: ICD-10-CM

## 2025-01-09 PROCEDURE — G2211 COMPLEX E/M VISIT ADD ON: CPT | Mod: NC

## 2025-01-09 PROCEDURE — 99214 OFFICE O/P EST MOD 30 MIN: CPT

## 2025-01-10 PROBLEM — E74.39 GLUCOSE INTOLERANCE: Status: ACTIVE | Noted: 2025-01-10

## 2025-01-10 PROBLEM — E66.3 OVERWEIGHT: Status: ACTIVE | Noted: 2025-01-10

## 2025-03-13 ENCOUNTER — RX RENEWAL (OUTPATIENT)
Age: 70
End: 2025-03-13

## 2025-04-28 ENCOUNTER — APPOINTMENT (OUTPATIENT)
Dept: HEPATOLOGY | Facility: CLINIC | Age: 70
End: 2025-04-28
Payer: COMMERCIAL

## 2025-04-28 VITALS
RESPIRATION RATE: 17 BRPM | TEMPERATURE: 98.6 F | SYSTOLIC BLOOD PRESSURE: 154 MMHG | WEIGHT: 239 LBS | HEIGHT: 72 IN | HEART RATE: 58 BPM | OXYGEN SATURATION: 96 % | BODY MASS INDEX: 32.37 KG/M2 | DIASTOLIC BLOOD PRESSURE: 81 MMHG

## 2025-04-28 DIAGNOSIS — R74.8 ABNORMAL LEVELS OF OTHER SERUM ENZYMES: ICD-10-CM

## 2025-04-28 DIAGNOSIS — R79.0 ABNORMAL LVL OF BLOOD MINERAL: ICD-10-CM

## 2025-04-28 DIAGNOSIS — B19.20 UNSPECIFIED VIRAL HEPATITIS C W/OUT HEPATIC COMA: ICD-10-CM

## 2025-04-28 PROCEDURE — 99214 OFFICE O/P EST MOD 30 MIN: CPT

## 2025-04-28 PROCEDURE — G2211 COMPLEX E/M VISIT ADD ON: CPT | Mod: NC

## 2025-04-29 LAB
A1AT SERPL-MCNC: 133 MG/DL
ALBUMIN SERPL ELPH-MCNC: 4.6 G/DL
ALP BLD-CCNC: 77 U/L
ALT SERPL-CCNC: 49 U/L
ANION GAP SERPL CALC-SCNC: 18 MMOL/L
AST SERPL-CCNC: 79 U/L
BASOPHILS # BLD AUTO: 0.04 K/UL
BASOPHILS NFR BLD AUTO: 0.5 %
BILIRUB SERPL-MCNC: 0.7 MG/DL
BUN SERPL-MCNC: 23 MG/DL
CALCIUM SERPL-MCNC: 9.8 MG/DL
CHLORIDE SERPL-SCNC: 98 MMOL/L
CK SERPL-CCNC: 237 U/L
CO2 SERPL-SCNC: 24 MMOL/L
CREAT SERPL-MCNC: 1.13 MG/DL
EGFRCR SERPLBLD CKD-EPI 2021: 71 ML/MIN/1.73M2
EOSINOPHIL # BLD AUTO: 0.21 K/UL
EOSINOPHIL NFR BLD AUTO: 2.6 %
ESTIMATED AVERAGE GLUCOSE: 117 MG/DL
GLUCOSE SERPL-MCNC: 118 MG/DL
HBA1C MFR BLD HPLC: 5.7 %
HCT VFR BLD CALC: 42.8 %
HGB BLD-MCNC: 14.9 G/DL
IGA SER QL IEP: 194 MG/DL
IGG SER QL IEP: 1221 MG/DL
IGM SER QL IEP: 47 MG/DL
IMM GRANULOCYTES NFR BLD AUTO: 0.6 %
LYMPHOCYTES # BLD AUTO: 3.76 K/UL
LYMPHOCYTES NFR BLD AUTO: 46.9 %
MAN DIFF?: NORMAL
MCHC RBC-ENTMCNC: 30 PG
MCHC RBC-ENTMCNC: 34.8 G/DL
MCV RBC AUTO: 86.3 FL
MONOCYTES # BLD AUTO: 0.65 K/UL
MONOCYTES NFR BLD AUTO: 8.1 %
NEUTROPHILS # BLD AUTO: 3.31 K/UL
NEUTROPHILS NFR BLD AUTO: 41.3 %
PLATELET # BLD AUTO: 232 K/UL
POTASSIUM SERPL-SCNC: 4.2 MMOL/L
PROT SERPL-MCNC: 7.4 G/DL
RBC # BLD: 4.96 M/UL
RBC # FLD: 13.8 %
SODIUM SERPL-SCNC: 139 MMOL/L
WBC # FLD AUTO: 8.02 K/UL

## 2025-05-02 LAB
PETH 16:0/18:1: 55 NG/ML
PETH 16:0/18:2: 88 NG/ML
PETH COMMENTS: NORMAL

## 2025-05-09 ENCOUNTER — APPOINTMENT (OUTPATIENT)
Dept: ULTRASOUND IMAGING | Facility: CLINIC | Age: 70
End: 2025-05-09
Payer: COMMERCIAL

## 2025-05-09 PROCEDURE — 76705 ECHO EXAM OF ABDOMEN: CPT

## 2025-05-29 ENCOUNTER — NON-APPOINTMENT (OUTPATIENT)
Age: 70
End: 2025-05-29

## 2025-07-10 LAB
ALBUMIN SERPL ELPH-MCNC: 4.7 G/DL
ALP BLD-CCNC: 88 U/L
ALT SERPL-CCNC: 52 U/L
ANION GAP SERPL CALC-SCNC: 18 MMOL/L
AST SERPL-CCNC: 91 U/L
BILIRUB SERPL-MCNC: 0.9 MG/DL
BUN SERPL-MCNC: 22 MG/DL
CALCIUM SERPL-MCNC: 9.7 MG/DL
CALCIUM SERPL-MCNC: 9.7 MG/DL
CHLORIDE SERPL-SCNC: 102 MMOL/L
CHOLEST SERPL-MCNC: 126 MG/DL
CO2 SERPL-SCNC: 22 MMOL/L
CREAT SERPL-MCNC: 1.05 MG/DL
CYSTATIN C SERPL-MCNC: 1.33 MG/L
EGFRCR SERPLBLD CKD-EPI 2021: 77 ML/MIN/1.73M2
ESTIMATED AVERAGE GLUCOSE: 111 MG/DL
GFR/BSA.PRED SERPLBLD CYS-BASED-ARV: 52 ML/MIN/1.73M2
GLUCOSE SERPL-MCNC: 114 MG/DL
HBA1C MFR BLD HPLC: 5.5 %
HCT VFR BLD CALC: 46 %
HDLC SERPL-MCNC: 29 MG/DL
HGB BLD-MCNC: 14.8 G/DL
LDLC SERPL-MCNC: 73 MG/DL
MCHC RBC-ENTMCNC: 30.1 PG
MCHC RBC-ENTMCNC: 32.2 G/DL
MCV RBC AUTO: 93.5 FL
NONHDLC SERPL-MCNC: 97 MG/DL
PARATHYROID HORMONE INTACT: 41 PG/ML
PHOSPHATE SERPL-MCNC: 3 MG/DL
PLATELET # BLD AUTO: 231 K/UL
POTASSIUM SERPL-SCNC: 4.3 MMOL/L
PROT SERPL-MCNC: 7.7 G/DL
RBC # BLD: 4.92 M/UL
RBC # FLD: 14 %
SODIUM SERPL-SCNC: 142 MMOL/L
TRIGL SERPL-MCNC: 132 MG/DL
WBC # FLD AUTO: 5.92 K/UL

## 2025-07-14 ENCOUNTER — NON-APPOINTMENT (OUTPATIENT)
Age: 70
End: 2025-07-14

## 2025-07-16 ENCOUNTER — APPOINTMENT (OUTPATIENT)
Dept: NEPHROLOGY | Facility: CLINIC | Age: 70
End: 2025-07-16
Payer: COMMERCIAL

## 2025-07-16 VITALS
BODY MASS INDEX: 32.01 KG/M2 | HEART RATE: 64 BPM | DIASTOLIC BLOOD PRESSURE: 82 MMHG | WEIGHT: 236 LBS | SYSTOLIC BLOOD PRESSURE: 134 MMHG

## 2025-07-16 PROCEDURE — G2211 COMPLEX E/M VISIT ADD ON: CPT | Mod: NC

## 2025-07-16 PROCEDURE — 99214 OFFICE O/P EST MOD 30 MIN: CPT
